# Patient Record
Sex: MALE | Race: OTHER
[De-identification: names, ages, dates, MRNs, and addresses within clinical notes are randomized per-mention and may not be internally consistent; named-entity substitution may affect disease eponyms.]

---

## 2021-12-20 ENCOUNTER — HOSPITAL ENCOUNTER (EMERGENCY)
Dept: HOSPITAL 26 - MED | Age: 31
Discharge: HOME | End: 2021-12-20
Payer: SELF-PAY

## 2021-12-20 VITALS — WEIGHT: 164 LBS | BODY MASS INDEX: 21.74 KG/M2 | HEIGHT: 73 IN

## 2021-12-20 VITALS — SYSTOLIC BLOOD PRESSURE: 134 MMHG | DIASTOLIC BLOOD PRESSURE: 90 MMHG

## 2021-12-20 DIAGNOSIS — L05.91: Primary | ICD-10-CM

## 2021-12-20 PROCEDURE — 99283 EMERGENCY DEPT VISIT LOW MDM: CPT

## 2021-12-20 PROCEDURE — 96372 THER/PROPH/DIAG INJ SC/IM: CPT

## 2021-12-20 PROCEDURE — 10080 I&D PILONIDAL CYST SIMPLE: CPT

## 2021-12-20 NOTE — NUR
Patient discharged with v/s stable. Written and verbal after care instructions 
given and explained. 

Patient alert, oriented and verbalized understanding of instructions. 
Ambulatory with steady gait. All questions addressed prior to discharge. ID 
band removed. Patient advised to follow up with PMD. Rx of NAPROXEN, CEPHALEXIN 
given. Patient educated on indication of medication including possible reaction 
and side effects. Opportunity to ask questions provided and answered.

## 2021-12-20 NOTE — NUR
PT C/O COCCYX PAIN X 3 DAYS. NO MEDS TAKEN. IN ED, VSS. WITH ERYTHEMA AND 
ABSCESS ON MEDIAL SUPERIOR ASPECT OF BUTTOCKS. ERMD MADE AWARE OF PT STATUS.



 



MEDHX: DENIES

MEDS: DENIES

NKA